# Patient Record
Sex: FEMALE | Race: OTHER | URBAN - NONMETROPOLITAN AREA
[De-identification: names, ages, dates, MRNs, and addresses within clinical notes are randomized per-mention and may not be internally consistent; named-entity substitution may affect disease eponyms.]

---

## 2021-08-13 ENCOUNTER — IMPORTED ENCOUNTER (OUTPATIENT)
Dept: URBAN - NONMETROPOLITAN AREA CLINIC 1 | Facility: CLINIC | Age: 61
End: 2021-08-13

## 2021-08-13 PROBLEM — H25.813: Noted: 2021-08-13

## 2021-08-13 PROCEDURE — 99204 OFFICE O/P NEW MOD 45 MIN: CPT

## 2021-08-13 NOTE — PATIENT DISCUSSION
Cataract(s)-Visually significant cataract OU .-Cataract(s) causing symptomatic impairment of visual function not correctable with a tolerable change in glasses or contact lenses lighting or non-operative means resulting in specific activity limitations and/or participation restrictions including but not limited to reading viewing television driving or meeting vocational or recreational needs. -Expectation is clearer vision and functional improvement in symptoms as well as reduced glare disability after cataract removal.-Order IOLMaster and OPD today. -Recommend Toric IOL   based on today's OPD testing and lifestyle questionnaire.-All questions were answered regarding surgery including pre and post-op medications appointments activity restrictions and anesthetic usage.-The risks benefits and alternatives and special risk factors for the patient were discussed in detail including but not limited to: bleeding infection retinal detachment vitreous loss problems with the implant and possible need for additional surgery.-Although rare the possibility of complete vision loss was discussed.-The possible need for glasses post-operatively was discussed.-Order medical clearance exam based on history of age of patient -Patient elects to proceed with cataract surgery OD . Will schedule at patient's convenience and re-evaluate OS  in the future. Discussed lens options w/ patient Based off todays testing pt qualifies for Toric and explained lens benefits and advised pt need for reading glasses. Start AT and Lotem[employer] TID OU x 1 Week and RTC in 1 week for repeat teresa OU.  Pt elects Trad Sx OU Post op inflammation anticipated discussed dextenza insertion after surgery.; 's Notes: No PCP

## 2021-09-20 PROBLEM — H25.813: Noted: 2021-09-20

## 2021-09-21 ENCOUNTER — IMPORTED ENCOUNTER (OUTPATIENT)
Dept: URBAN - NONMETROPOLITAN AREA CLINIC 1 | Facility: CLINIC | Age: 61
End: 2021-09-21

## 2021-10-02 ENCOUNTER — IMPORTED ENCOUNTER (OUTPATIENT)
Dept: URBAN - NONMETROPOLITAN AREA CLINIC 1 | Facility: CLINIC | Age: 61
End: 2021-10-02

## 2021-10-02 PROBLEM — Z98.41: Noted: 2021-10-02

## 2021-10-02 PROBLEM — H25.812: Noted: 2021-10-02

## 2021-10-02 PROCEDURE — 99024 POSTOP FOLLOW-UP VISIT: CPT

## 2021-10-02 NOTE — PATIENT DISCUSSION
s/p PC IOL OD Toric/Trad 10/1/2021-  discussed findings w/patient -  Pt doing well s/p PCIOL. -  Continue post-op gtts according to instruction sheet and sleep with eye shield over eye for 7 nights. -  Avoid bending at the waist lifting anything over 5lbs and dirty or ayesha environments.-  RTC as scheduled or prn; 's Notes: No PCP

## 2021-10-07 ENCOUNTER — IMPORTED ENCOUNTER (OUTPATIENT)
Dept: URBAN - NONMETROPOLITAN AREA CLINIC 1 | Facility: CLINIC | Age: 61
End: 2021-10-07

## 2021-10-07 PROBLEM — Z01.818: Noted: 2021-10-07

## 2021-10-07 PROBLEM — H25.812: Noted: 2021-10-07

## 2021-10-07 PROBLEM — Z98.41: Noted: 2021-10-07

## 2021-10-07 NOTE — PATIENT DISCUSSION
Cataract(s)-Visually significant cataract OS . -Cataract(s) causing symptomatic impairment of visual function not correctable with a tolerable change in glasses or contact lenses lighting or non-operative means resulting in specific activity limitations and/or participation restrictions including but not limited to reading viewing television driving or meeting vocational or recreational needs. -Expectation is clearer vision and functional improvement in symptoms as well as reduced glare disability after cataract removal.-Recommend Standard Trad Toric IOL    based on previous OPD testing and lifestyle questionnaire.-All questions were answered regarding surgery including pre and post-op medications appointments activity restrictions and anesthetic usage.-The risks benefits and alternatives and special risk factors for the patient were discussed in detail including but not limited to: bleeding infection retinal detachment vitreous loss problems with the implant and possible need for additional surgery.-Although rare the possibility of complete vision loss was discussed.-The need for glasses post-operatively was discussed.-Patient elects to proceed with cataract surgery OS . Will schedule at patient's convenience. -Pt wishes to proceed with Standard Trad Toric IOL OSs/p PCIOL-Pt doing well at 1 week s/p PCIOL. -Continue post-op gtts according to instruction sheet.-Okay to resume usual activites and d/c eye shield. -OD Refractive outcome is plano -2.25 x 068 20/42-8-rkillwd lens and it is lined up-Suggests to see Dr. Jeremy Yarbruogh before her next surgery.; 's Notes: No PCP

## 2021-10-07 NOTE — PATIENT DISCUSSION
Medical Clearance-Medical clearance done today. -No outstanding concerns that would preclude surgery.-Patient is cleared to proceed with scheduled surgery.; Dr's Notes: No PCP

## 2021-10-15 ENCOUNTER — IMPORTED ENCOUNTER (OUTPATIENT)
Dept: URBAN - NONMETROPOLITAN AREA CLINIC 1 | Facility: CLINIC | Age: 61
End: 2021-10-15

## 2021-10-15 PROBLEM — Z98.41: Noted: 2021-10-15

## 2021-10-15 PROBLEM — H25.812: Noted: 2021-10-15

## 2021-10-15 PROCEDURE — 99024 POSTOP FOLLOW-UP VISIT: CPT

## 2021-10-15 NOTE — PATIENT DISCUSSION
s/p PCIOL CE OD Toric/Trad 10/01/21 -Pt doing well at 2 week s/p PCIOL. -Continue post-op gtts according to instruction sheet. -OCT MAC done today stable &  w/ no retinal issues such as tears holes or   breaks. Stable. -Reviewed pts scans and her implant is exactly centered & axis is on target-Discussed pts preop RX & now she has some residual astigmatism &  nearsightedness and explained that she fell between 2 power implants. -MR done today shows that patient VA can be enhanced. -VA OD near is 20/30. She now has mono vsision in a sense. -Discussed [address] enhancement as a courtesy. Explained [address] Enhancement with all     RBA's to pt &  today. Answered all pts questions and concerns today. -Discussed post op VA expectations w/ pt & advised I can not guarantee that [place] will be 20/20 s/p PRK but it will improve much more. s/p PRK enhancment OD would be corrected for distance & she no longer would have that mono vision & would need gls for reading. -Pt inquired about not doing [address] & having gls. Discussed. -Discussed that I still recommend proceeding with CE OS for balance but if she  wishes to wait for now that is ok but explained anisemotropia. -Discussed proceeding w/ CE OS for distance and hold off on [address] for OD if she likes the mono vision because then she would have the mono vision w/ OD @ near and OS corrected for distance. If pt wishes  can proceed w/ CE OS and see how she likes the mono vision & if she does not we can then proceed w/ the [address] OD -Advised pt to make whatever decision she feels will suit her quality of life & what will make her happy. -Long discussion pt decides to proceed w/ CE OS for distance let us know how she likes the mono vision.  Can proceed w/ PRK OD in future at courtesy still if she wishes* Maricarmen Urbina scheduled 10/25 for CE OS @ [facility] keep this date if wants to proceed*

## 2021-10-26 ENCOUNTER — IMPORTED ENCOUNTER (OUTPATIENT)
Dept: URBAN - NONMETROPOLITAN AREA CLINIC 1 | Facility: CLINIC | Age: 61
End: 2021-10-26

## 2021-10-26 NOTE — PATIENT DISCUSSION
10/26/21 Pt wishes to do Stand/Trad OS & understands she will need gls. MR done today & offered gls rx for pt to have made for clearer VA until next sx if she desires. Discussed VA expectations. Pt states she is ok to wear glasses s/p sx. NOTES BELOW FROM PREVIOUS: s/p PCIOL CE OD Toric/Trad 10/01/21 -Pt doing well at 2 week s/p PCIOL. -Continue post-op gtts according to instruction sheet. -OCT MAC done previously stable &  w/ no retinal issues such as tears holes or   breaks. Stable. -Reviewed pts scans and her implant is exactly centered & axis is on target-Discussed pts preop RX & now she has some residual astigmatism &  nearsightedness and explained that she fell between 2 power implants. -MR done today shows that patient VA can be enhanced. -VA OD near is 20/30. She now has mono vsision in a sense. -Discussed [address] enhancement as a courtesy. Explained [address] Enhancement with all     RBA's to pt &  today. Answered all pts questions and concerns today. -Discussed post op VA expectations w/ pt & advised I can not guarantee that [place] will be 20/20 s/p PRK but it will improve much more. s/p PRK enhancment OD would be corrected for distance & she no longer would have that mono vision & would need gls for reading. -Pt inquired about not doing [address] & having gls. Discussed. -Discussed that I still recommend proceeding with CE OS for balance but if she  wishes to wait for now that is ok but explained anisemotropia. -Discussed proceeding w/ CE OS for distance and hold off on [address] for OD if she likes the mono vision because then she would have the mono vision w/ OD @ near and OS corrected for distance. If pt wishes  can proceed w/ CE OS and see how she likes the mono vision & if she does not we can then proceed w/ the [address] OD -Advised pt to make whatever decision she feels will suit her quality of life & what will make her happy. -Long discussion pt decides to proceed w/ CE OS for distance. Pt states she does not want to do mono vision.

## 2021-10-29 ENCOUNTER — IMPORTED ENCOUNTER (OUTPATIENT)
Dept: URBAN - NONMETROPOLITAN AREA CLINIC 1 | Facility: CLINIC | Age: 61
End: 2021-10-29

## 2021-10-29 PROBLEM — Z98.41: Noted: 2021-10-29

## 2021-10-29 PROBLEM — Z98.42: Noted: 2021-10-29

## 2021-10-29 PROCEDURE — 99024 POSTOP FOLLOW-UP VISIT: CPT

## 2021-10-29 NOTE — PATIENT DISCUSSION
s/p PC IOL OS Stand/Trad 10/28/2021-  discussed findings w/patient-  Pt doing well s/p PCIOL. -  Continue post-op gtts according to instruction sheet and sleep with eye shield over eye for 7 nights. -  Avoid bending at the waist lifting anything over 5lbs and dirty or ayesha environments.-  RTC 1 week as scheduled or prn

## 2021-11-05 ENCOUNTER — IMPORTED ENCOUNTER (OUTPATIENT)
Dept: URBAN - NONMETROPOLITAN AREA CLINIC 1 | Facility: CLINIC | Age: 61
End: 2021-11-05

## 2021-11-05 PROCEDURE — 99024 POSTOP FOLLOW-UP VISIT: CPT

## 2021-11-05 NOTE — PATIENT DISCUSSION
s/p PC IOL OS Stand/Trad 10/28/2021-  discussed findings w/patient-  Pt doing well at 1 week s/p PCIOL. -  Continue post-op gtts according to instruction sheet.-  Okay to resume usual activites and d/c eye shield. -  Patient is going to schedule w/JS for possible PRK eval

## 2021-12-10 ENCOUNTER — IMPORTED ENCOUNTER (OUTPATIENT)
Dept: URBAN - NONMETROPOLITAN AREA CLINIC 1 | Facility: CLINIC | Age: 61
End: 2021-12-10

## 2021-12-10 PROCEDURE — 99024 POSTOP FOLLOW-UP VISIT: CPT

## 2021-12-10 NOTE — PATIENT DISCUSSION
s/p CE OD Toric/Trad 10/1/21  CE OS Stand/Trad 10/20/21- Lens in place OU -Significant PCO OD noted today recommend YAG PC OD & discussed RBA's. Pt agrees w/plan. YAG PC OD @ n/c today. Consent signed & obtained prior to procedure. -Discussed again today [address] Enhancment & pt wishes to proceed still. -Discussed VA expectations distance & near s/p.  Advsied pt she WILL need readers after [address]. -Handwritten RX given in office today -Pt scheudled with [facility] for [address] sx DOS -Pt to come in prior in early Jan for MR by Frederick Cabezas to notify Yana Fletcher or [facility] of her findings. -Task sent to [facility] (Riky Rees came in exam room and scheduled patient& went over pre op & post op)

## 2021-12-13 PROBLEM — H26.491: Noted: 2021-12-13

## 2021-12-13 PROBLEM — Z98.42: Noted: 2021-10-29

## 2021-12-13 PROBLEM — Z98.41: Noted: 2021-10-29

## 2021-12-21 ENCOUNTER — IMPORTED ENCOUNTER (OUTPATIENT)
Dept: URBAN - NONMETROPOLITAN AREA CLINIC 1 | Facility: CLINIC | Age: 61
End: 2021-12-21

## 2022-01-05 ENCOUNTER — IMPORTED ENCOUNTER (OUTPATIENT)
Dept: URBAN - NONMETROPOLITAN AREA CLINIC 1 | Facility: CLINIC | Age: 62
End: 2022-01-05

## 2022-01-05 NOTE — PATIENT DISCUSSION
[address] enhancement done today w/o any complications. Patient was given  RX for  [place] 5/300 PRN for pain. RTC in one day with rafael and every day until the BCL is taken out. Then RTC in one month with Dr. Bernabe Fall.

## 2022-01-06 ENCOUNTER — IMPORTED ENCOUNTER (OUTPATIENT)
Dept: URBAN - NONMETROPOLITAN AREA CLINIC 1 | Facility: CLINIC | Age: 62
End: 2022-01-06

## 2022-01-06 PROCEDURE — 99024 POSTOP FOLLOW-UP VISIT: CPT

## 2022-01-06 NOTE — PATIENT DISCUSSION
1 day s/p PRK OD 1/5/2022-  discussed findings w/patient-  she feels that things are blurry today but explained that vision has already improved since last visit here-  continue Ketorolac QID-  continue PF 1% QID-  continue Cipro QID-  RTC 1 week f/u or prn

## 2022-01-14 ENCOUNTER — IMPORTED ENCOUNTER (OUTPATIENT)
Dept: URBAN - NONMETROPOLITAN AREA CLINIC 1 | Facility: CLINIC | Age: 62
End: 2022-01-14

## 2022-01-14 PROCEDURE — 99024 POSTOP FOLLOW-UP VISIT: CPT

## 2022-01-14 NOTE — PATIENT DISCUSSION
1 week POV PRK Enhancement OD-  discussed findings w/patient-  d/c Cipro-  continue Ketorolac QID OD-  continue Prednisolone TID OD-  RTC 1 week f/u

## 2022-01-18 ENCOUNTER — PREPPED CHART (OUTPATIENT)
Dept: URBAN - NONMETROPOLITAN AREA CLINIC 4 | Facility: CLINIC | Age: 62
End: 2022-01-18

## 2022-01-18 ASSESSMENT — VISUAL ACUITY
OD_PH: 20/40+2
OD_SC: 20/60
OS_SC: 20/30+1

## 2022-01-24 ENCOUNTER — POST-OP (OUTPATIENT)
Dept: URBAN - NONMETROPOLITAN AREA CLINIC 4 | Facility: CLINIC | Age: 62
End: 2022-01-24

## 2022-01-24 DIAGNOSIS — Z98.890: ICD-10-CM

## 2022-01-24 PROCEDURE — 99024 POSTOP FOLLOW-UP VISIT: CPT

## 2022-01-24 ASSESSMENT — VISUAL ACUITY
OD_SC: 20/80+1
OS_SC: 20/50
OD_PH: 20/25-1
OS_SC: 20/30+2
OD_SC: 20/200

## 2022-03-11 ENCOUNTER — POST-OP (OUTPATIENT)
Dept: URBAN - NONMETROPOLITAN AREA CLINIC 4 | Facility: CLINIC | Age: 62
End: 2022-03-11

## 2022-03-11 DIAGNOSIS — Z98.890: ICD-10-CM

## 2022-03-11 PROCEDURE — 99024 POSTOP FOLLOW-UP VISIT: CPT

## 2022-03-11 ASSESSMENT — VISUAL ACUITY
OD_SC: 20/30
OS_SC: 20/25+2
OU_SC: 20/25

## 2022-03-11 ASSESSMENT — TONOMETRY
OS_IOP_MMHG: 15
OD_IOP_MMHG: 15

## 2022-03-11 NOTE — PATIENT DISCUSSION
Pt is doing well today with much improved VA. Discussed OTC readers strength. Pt request RX reading glasses, does not like the OTC readers. Pt is pleased with VA outcome. Stable. Continue to monitor.

## 2022-04-15 ASSESSMENT — VISUAL ACUITY
OD_PH: 20/30
OS_SC: 20/20
OD_PH: 20/40+2
OS_CC: 20/25+
OD_SC: 20/200
OD_PH: 20/50
OS_GLARE: 20/30
OS_PH: 20/20
OS_AM: 20/20
OS_PH: 20/30
OD_PAM: 20/20
OD_SC: 20/50
OS_CC: 20/40+2
OU_CC: 20/20
OS_CC: 20/20
OD_PH: 20/25
OD_CC: 20/60
OD_PH: 20/30-1
OD_CC: 20/50
OS_SC: 20/30
OU_SC: 20/40
OS_CC: 20/200
OD_CC: 20/25
OD_CC: 20/50-1
OS_CC: 20/30
OD_PH: 20/25(BLURRY)
OS_CC: 20/50+2
OS_PH: 20/20
OD_CC: 20/50
OD_CC: 20/80-2
OD_SC: 20/30
OS_PH: 20/25
OU_CC: 20/30
OS_SC: 20/20
OS_CC: 20/30+
OS_CC: 20/30
OS_AM: 20/20
OD_CC: 20/60+2
OD_PH: 20/25
OD_CC: 20/50+2
OD_PH: 20/40+2
OD_CC: 20/60
OD_CC: 20/60
OS_CC: 20/25+2
OU_CC: 20/20

## 2022-04-15 ASSESSMENT — TONOMETRY
OD_IOP_MMHG: 16
OD_IOP_MMHG: 16
OS_IOP_MMHG: 16
OD_IOP_MMHG: 15
OS_IOP_MMHG: 16
OD_IOP_MMHG: 14
OS_IOP_MMHG: 16
OS_IOP_MMHG: 13
OS_IOP_MMHG: 16
OS_IOP_MMHG: 15
OD_IOP_MMHG: 16
OS_IOP_MMHG: 16
OS_IOP_MMHG: 16
OD_IOP_MMHG: 15
OD_IOP_MMHG: 16
OD_IOP_MMHG: 22

## 2022-04-26 ENCOUNTER — POST-OP (OUTPATIENT)
Dept: URBAN - NONMETROPOLITAN AREA CLINIC 4 | Facility: CLINIC | Age: 62
End: 2022-04-26

## 2022-04-26 DIAGNOSIS — Z98.890: ICD-10-CM

## 2022-04-26 PROCEDURE — 99024 POSTOP FOLLOW-UP VISIT: CPT

## 2022-04-26 ASSESSMENT — VISUAL ACUITY
OD_SC: 20/30+1
OS_SC: 20/20

## 2022-04-26 NOTE — PATIENT DISCUSSION
Pt is doing well today with much improved VA. Discussed OTC readers strength. Pt request RX reading glasses, does not like the OTC readers. RX done & GLS RX given. Pt is pleased with VA outcome. Stable. Continue to monitor.